# Patient Record
Sex: MALE | Race: WHITE | Employment: FULL TIME | URBAN - METROPOLITAN AREA
[De-identification: names, ages, dates, MRNs, and addresses within clinical notes are randomized per-mention and may not be internally consistent; named-entity substitution may affect disease eponyms.]

---

## 2019-08-21 ENCOUNTER — MEDICAL CORRESPONDENCE (OUTPATIENT)
Dept: HEALTH INFORMATION MANAGEMENT | Facility: CLINIC | Age: 34
End: 2019-08-21

## 2019-09-04 ENCOUNTER — TELEPHONE (OUTPATIENT)
Dept: PULMONOLOGY | Facility: CLINIC | Age: 34
End: 2019-09-04

## 2019-09-04 NOTE — TELEPHONE ENCOUNTER
At the request of Bandar's primary care provider, I contacted him to discuss his referral to the [facility] for CF carrier screening.  Bandar's wife who is currently pregnant recently learned she is a CF carrier, prompting the recommendation for Bandar to have testing.  Briefly reviewed the process for carrier screening and an appointment was scheduled at his convenience.     Yamile Carl MS, [facility]  Genetic Counselor  [facility]  [facility]

## 2019-09-19 ENCOUNTER — OFFICE VISIT (OUTPATIENT)
Dept: PULMONOLOGY | Facility: CLINIC | Age: 34
End: 2019-09-19
Attending: GENETIC COUNSELOR, MS
Payer: COMMERCIAL

## 2019-09-19 DIAGNOSIS — Z13.71 SCREENING FOR GENETIC DISEASE CARRIER STATUS: Primary | ICD-10-CM

## 2019-09-19 DIAGNOSIS — Z31.5 ENCOUNTER FOR PROCREATIVE GENETIC COUNSELING: ICD-10-CM

## 2019-09-19 PROCEDURE — 36415 COLL VENOUS BLD VENIPUNCTURE: CPT | Performed by: PEDIATRICS

## 2019-09-19 PROCEDURE — 96040 ZZH GENETIC COUNSELING, EACH 30 MINUTES: CPT | Mod: ZF | Performed by: GENETIC COUNSELOR, MS

## 2019-09-19 NOTE — LETTER
Date:September 23, 2019      Provider requested that no letter be sent. Do not send.       [facility] Health Information

## 2019-09-19 NOTE — LETTER
9/19/2019      RE: Bandar Wynn  [address] [address]       Presenting Information:  Bandar Wynn is a 34-year-old man whose wife Jennifer was recently found to be a carrier for cystic fibrosis.  Her specific mutation  is unknown and records will be requested.  Bandar was therefore referred for genetic counseling and testing regarding this.  During our visit today a family history was collected, the genetics and inheritance of cystic fibrosis were reviewed, and the options for carrier screening were discussed.  At the conclusion of our appointment informed consent for genetic testing was obtained and testing was pursued.      Family History:  A detailed pedigree was obtained and scanned into the electronic medical record.  It is significant for the following:     Bandar himself has been healthy.      Bandar has a 10-year-old son from a previous relationship.  He is healthy but has a history of speech delay.     Bandar has a 40-year-old maternal half-sister with Meniere's disease.  This woman has two healthy children.    Bandar has a healthy 29-year-old half-brother who is healthy and has a healthy daughter.     Bandar has 4 paternal half-siblings who he has limited information about.     Bandar's mother is 59-years-old and healthy.     Bandar's father is 63-years-old.  He is thought to be healthy but he has limited contact with him.    Bandar's wife Jennifer is 28-years-old and was found to be a carrier for cystic fibrosis by a screen performed by her OBGYN.    Jennifer has a 26-year-old sister who may have infertility.  Jennifer also has a 29-year-old brother who is healthy and has a healthy son.    Jennifer's parents are in their 50s and thought to be healthy.     There is no known family history of cystic fibrosis or other known genetic conditions on either side of the family.    Bandar is of [place], [place], and [place] ancestry on his maternal side and [place] ancestry on his paternal side.  Consanguinity was  denied.      Discussion:  Today we reviewed that genes are long stretches of DNA that are responsible for how our bodies look and how our bodies work. We all have two copies of every gene; one inherited from the mother and one inherited from the father. When there is a change, called a mutation, in a gene it can cause it to not do its job correctly which can cause the signs and symptoms of a genetic condition. Cystic fibosis is caused by mutations in a gene called CFTR.      Cystic fibrosis (CF) is inherited in an autosomal recessive pattern. This means that to be affected an individual must inherit a mutation in both copies of the CFTR gene (one from each parent). Individuals with just one mutation in the CFTR gene are said to be carriers. Carriers do not have cystic fibrosis but can have an affected child if their partner is also a carrier.  Bandar's wife Jennifer was reportedly found to be a carrier for cystic fibrosis, and therefore has a mutation on one copy of her CFTR gene.   Records were not available for my review today and a release of information form will be sent to Bandar's wife to request these records.      There is a 50% chance Jennifer passed down this mutation to her current pregnancy.  If Bandar is found to be a carrier for cystic fibrosis, the baby's chance to have CF is 25%, the chance for the baby to be a carrier is 50%, and the chance for the baby to be unaffected and not a carrier is 25%.  The chances would be the same for any future pregnancy.  If Bandar is not found to be a carrier, the chance for this or any future baby to have CF is very significantly reduced, but not zero.  Based on Bandar's / ancestry, he has an approximately 1/25 (4%) chance to be a carrier for cystic fibrosis.  Therefore before Bandar's testing, the chance for the couple to have a child with CF is approximately 1/100 (1%).      To clarify this chance, carrier screening is available to Bandar.  We reviewed  the options for carrier screening including for the CFTR gene only, or expanded carrier screening for a large panel of genes in addition to the CFTR gene.  After a detailed discussion about the potential costs, benefits, and limitations of the options Bandar preferred to pursue carrier screening for cystic fibrosis only.  This will include sequencing and deletion/duplication of the CFTR gene, which is the most comprehensive testing available for this gene, and a negative result on this test will most significantly reduce the chance for the couple to have an affected child.  Written informed consent was obtained.  This will be performed at [employer] who will communicate with Bandar directly with an estimate of insurance benefits and option for out of pocket billing.  Results are expected in approximately 2-3 weeks and I will contact Bandar by telephone when results return.      Finally, we briefly reviewed the reproductive options available to the couple in the event Bandar's carrier screen is positive.  Depending on how far along Jennifer is, prenatal testing to may be available to determine whether the baby has cystic fibrosis.  This would also be available for a future pregnancy.  With these procedures there is a risk for miscarriage.  During a future pregnancy the couple could also consider in vitro fertilization (IVF) with pre-implantation genetic diagnosis (PGD) if they wished to avoid an affected pregnancy.  These can be discussed in more detail following return of results.       I appreciate the opportunity to be a part of Bandar's care today.  My contact information was shared should he or his wife have additional questions or concerns.     Plan:  1.  Carrier screening for cystic fibrosis through sequencing and deletion duplication of the CFTR gene   2.  I will contact Bandar when results return in approximately 2-3 weeks  3.  Follow as determined by results of the above testing       Yamile Carl MS,  CGC  Genetic Counselor  [facility]  [facility]    Total time spent in consultation with the family was approximately 30 minutes    Cc: No letter          Yamile Carl

## 2019-09-20 LAB — MISCELLANEOUS TEST: NORMAL

## 2019-09-20 NOTE — PROGRESS NOTES
Presenting Information:  Bandar Wynn is a 34-year-old man whose wife Jennifer was recently found to be a carrier for cystic fibrosis.  Her specific mutation  is unknown and records will be requested.  Bandar was therefore referred for genetic counseling and testing regarding this.  During our visit today a family history was collected, the genetics and inheritance of cystic fibrosis were reviewed, and the options for carrier screening were discussed.  At the conclusion of our appointment informed consent for genetic testing was obtained and testing was pursued.      Family History:  A detailed pedigree was obtained and scanned into the electronic medical record.  It is significant for the following:     Bandar himself has been healthy.      Bandar has a 10-year-old son from a previous relationship.  He is healthy but has a history of speech delay.     Bandar has a 40-year-old maternal half-sister with Meniere's disease.  This woman has two healthy children.    Bandar has a healthy 29-year-old half-brother who is healthy and has a healthy daughter.     Bandar has 4 paternal half-siblings who he has limited information about.     Bandar's mother is 59-years-old and healthy.     Bandar's father is 63-years-old.  He is thought to be healthy but he has limited contact with him.    Bandar's wife Jennifer is 28-years-old and was found to be a carrier for cystic fibrosis by a screen performed by her OBGYN.    Jennifer has a 26-year-old sister who may have infertility.  Jennifer also has a 29-year-old brother who is healthy and has a healthy son.    Jennifer's parents are in their 50s and thought to be healthy.     There is no known family history of cystic fibrosis or other known genetic conditions on either side of the family.    Bandar is of [place], [place], and [place] ancestry on his maternal side and [place] ancestry on his paternal side.  Consanguinity was denied.      Discussion:  Today we reviewed that genes are long stretches  of DNA that are responsible for how our bodies look and how our bodies work. We all have two copies of every gene; one inherited from the mother and one inherited from the father. When there is a change, called a mutation, in a gene it can cause it to not do its job correctly which can cause the signs and symptoms of a genetic condition. Cystic fibosis is caused by mutations in a gene called CFTR.      Cystic fibrosis (CF) is inherited in an autosomal recessive pattern. This means that to be affected an individual must inherit a mutation in both copies of the CFTR gene (one from each parent). Individuals with just one mutation in the CFTR gene are said to be carriers. Carriers do not have cystic fibrosis but can have an affected child if their partner is also a carrier.  Bandar's wife Jennifer was reportedly found to be a carrier for cystic fibrosis, and therefore has a mutation on one copy of her CFTR gene.  Records were not available for my review today and a release of information form will be sent to Bandar's wife to request these records.      There is a 50% chance Jennifer passed down this mutation to her current pregnancy.  If Bandar is found to be a carrier for cystic fibrosis, the baby's chance to have CF is 25%, the chance for the baby to be a carrier is 50%, and the chance for the baby to be unaffected and not a carrier is 25%.  The chances would be the same for any future pregnancy.  If Bandar is not found to be a carrier, the chance for this or any future baby to have CF is very significantly reduced, but not zero.  Based on Bandar's / ancestry, he has an approximately 1/25 (4%) chance to be a carrier for cystic fibrosis.  Therefore before Bandar's testing, the chance for the couple to have a child with CF is approximately 1/100 (1%).      To clarify this chance, carrier screening is available to Bandar.  We reviewed the options for carrier screening including for the CFTR gene only, or  expanded carrier screening for a large panel of genes in addition to the CFTR gene.  After a detailed discussion about the potential costs, benefits, and limitations of the options Bandar preferred to pursue carrier screening for cystic fibrosis only.  This will include sequencing and deletion/duplication of the CFTR gene, which is the most comprehensive testing available for this gene, and a negative result on this test will most significantly reduce the chance for the couple to have an affected child.  Written informed consent was obtained.  This will be performed at [employer] who will communicate with Bandar directly with an estimate of insurance benefits and option for out of pocket billing.  Results are expected in approximately 2-3 weeks and I will contact Bandar by telephone when results return.      Finally, we briefly reviewed the reproductive options available to the couple in the event Bandar's carrier screen is positive.  Depending on how far along Jennifer is, prenatal testing to may be available to determine whether the baby has cystic fibrosis.  This would also be available for a future pregnancy.  With these procedures there is a risk for miscarriage.  During a future pregnancy the couple could also consider in vitro fertilization (IVF) with pre-implantation genetic diagnosis (PGD) if they wished to avoid an affected pregnancy.  These can be discussed in more detail following return of results.       I appreciate the opportunity to be a part of Bandar's care today.  My contact information was shared should he or his wife have additional questions or concerns.     Plan:  1.  Carrier screening for cystic fibrosis through sequencing and deletion duplication of the CFTR gene   2.  I will contact Bandar when results return in approximately 2-3 weeks  3.  Follow as determined by results of the above testing       Yamile Carl MS, [facility]  Genetic Counselor  [facility] [place] [facility]  [facility]    Total time spent in consultation with the family was approximately 30 minutes    Cc: No letter

## 2019-09-30 ENCOUNTER — TELEPHONE (OUTPATIENT)
Dept: PULMONOLOGY | Facility: CLINIC | Age: 34
End: 2019-09-30

## 2019-09-30 NOTE — TELEPHONE ENCOUNTER
I contacted Bandar to discuss the results of his recent genetic testing for cystic fibrosis.  This was pursued due to his wife's known carrier status for cystic fibrosis.  Bandar's genetic testing has returned negative (normal), very significantly reducing the chance that he is a CF carrier and that he and his wife could have a child with this condition.  Bandar expressed understanding and had no additional questions at this time.  A copy of his report as well as a brief summary letter will be sent to Bandar by mail.  I remain available for any additional questions or concerns.       Yamile Carl MS, [facility]  Genetic Counselor  [facility]  [facility]

## 2019-10-01 LAB — LAB SCANNED RESULT: NORMAL
